# Patient Record
Sex: FEMALE | Race: WHITE | URBAN - METROPOLITAN AREA
[De-identification: names, ages, dates, MRNs, and addresses within clinical notes are randomized per-mention and may not be internally consistent; named-entity substitution may affect disease eponyms.]

---

## 2021-12-10 ENCOUNTER — OFFICE VISIT (OUTPATIENT)
Dept: RHEUMATOLOGY | Age: 46
End: 2021-12-10
Payer: COMMERCIAL

## 2021-12-10 VITALS
TEMPERATURE: 98.3 F | HEART RATE: 83 BPM | RESPIRATION RATE: 18 BRPM | HEIGHT: 64 IN | WEIGHT: 172 LBS | BODY MASS INDEX: 29.37 KG/M2 | SYSTOLIC BLOOD PRESSURE: 125 MMHG | OXYGEN SATURATION: 96 % | DIASTOLIC BLOOD PRESSURE: 70 MMHG

## 2021-12-10 DIAGNOSIS — M15.9 PRIMARY OSTEOARTHRITIS INVOLVING MULTIPLE JOINTS: Primary | ICD-10-CM

## 2021-12-10 DIAGNOSIS — N18.31 STAGE 3A CHRONIC KIDNEY DISEASE (HCC): ICD-10-CM

## 2021-12-10 DIAGNOSIS — M79.10 MYALGIA: ICD-10-CM

## 2021-12-10 DIAGNOSIS — R76.8 RHEUMATOID FACTOR POSITIVE: ICD-10-CM

## 2021-12-10 PROCEDURE — 99205 OFFICE O/P NEW HI 60 MIN: CPT | Performed by: INTERNAL MEDICINE

## 2021-12-10 RX ORDER — ALBUTEROL SULFATE 0.83 MG/ML
3 SOLUTION RESPIRATORY (INHALATION)
COMMUNITY

## 2021-12-10 RX ORDER — MELOXICAM 15 MG/1
15 TABLET ORAL DAILY
Qty: 30 TABLET | Refills: 6 | Status: SHIPPED | OUTPATIENT
Start: 2021-12-10 | End: 2022-01-09

## 2021-12-10 RX ORDER — ALBUTEROL SULFATE 90 UG/1
2 AEROSOL, METERED RESPIRATORY (INHALATION)
COMMUNITY
Start: 2020-12-29
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
## 2021-12-10 NOTE — LETTER
12/27/2021 1:14 AM    Patient:  Yogesh Asher   YOB: 1975  Date of Visit: 12/10/2021      Dear Dr. Janeth Mena:    Thank you for referring Ms. Sussy Javier to me for evaluation/treatment. Below are the relevant portions of my assessment and plan of care. If you have questions, please do not hesitate to call me.       Sincerely,  Amado Fernandez MD  Cell: [phone]

## 2021-12-22 ENCOUNTER — TELEPHONE (OUTPATIENT)
Dept: RHEUMATOLOGY | Age: 46
End: 2021-12-22

## 2021-12-22 NOTE — TELEPHONE ENCOUNTER
Ernestina Crawley with [facility] [address] called to follow up on their patient referral to Dr. Deon Flor. Patient was referred by Elisabeth Leonardo NP (PCP)    New Patient appt was on: 12/10/2021    Elisabeth Leonardo NP is requesting office notes and lab results to be faxed to their office at:  Teto Canela had just spoken to patient and patient mentioned that she had not yet heard from anyone at Rheumatology office about the results of her labs. I took a look at labs and see that they are still listed as [place]. \" I relayed to Sundar Frederick., that no one has contacted pt about lab results because we still do not have the lab results. I asked her to relay this to patient when she calls her back. I told Sundar Frederick. that it seems odd that we do not have lab results and it is 12 days later, and said that I would mention this in the note to Dr. Deon Flor and his nurse.

## 2022-01-05 ENCOUNTER — TELEPHONE (OUTPATIENT)
Dept: RHEUMATOLOGY | Age: 47
End: 2022-01-05

## 2022-01-05 NOTE — TELEPHONE ENCOUNTER
Pulled up [facility] labs and no record of repeated labs since Dr. Yajaira De Dios labs on 11/4. If patient calls back, please request which lab she went to for labs so we can reach out for results as the ordering provider.

## 2022-01-05 NOTE — LETTER
1/5/2022    Ms. [address] [facility] [address]      Dear Radha Doherty    We have been trying unsuccessfully to reach you, it seems your voicemail inbox is full. We are attempting to get the results of your labs that you had done for us after our December visit. Please reach out to the lab where you had this done and have them fax us the results directly to us at fax# [phone]. If you let us know where you had this done, we can also take care of this for you. Please also sign up for [employer] so we can easily communicate and you can see your notes and labs.     Please call me if you have any questions: [phone]        Sincerely,  Joanne Rudolph MD

## 2022-01-05 NOTE — TELEPHONE ENCOUNTER
Attempt to call patient to see when and where she had her labs drawn, mailbox is full. No labs results in chart since last visit.

## 2023-05-14 RX ORDER — ALBUTEROL SULFATE 2.5 MG/3ML
3 SOLUTION RESPIRATORY (INHALATION)
COMMUNITY

## 2023-05-14 RX ORDER — ALBUTEROL SULFATE 90 UG/1
2 AEROSOL, METERED RESPIRATORY (INHALATION)
COMMUNITY
Start: 2020-12-29